# Patient Record
Sex: MALE | Race: WHITE | NOT HISPANIC OR LATINO
[De-identification: names, ages, dates, MRNs, and addresses within clinical notes are randomized per-mention and may not be internally consistent; named-entity substitution may affect disease eponyms.]

---

## 2020-03-06 ENCOUNTER — APPOINTMENT (OUTPATIENT)
Dept: NEUROLOGY | Facility: CLINIC | Age: 65
End: 2020-03-06
Payer: COMMERCIAL

## 2020-03-06 VITALS
DIASTOLIC BLOOD PRESSURE: 106 MMHG | HEIGHT: 74 IN | SYSTOLIC BLOOD PRESSURE: 147 MMHG | HEART RATE: 75 BPM | BODY MASS INDEX: 26.18 KG/M2 | WEIGHT: 204 LBS | OXYGEN SATURATION: 97 %

## 2020-03-06 DIAGNOSIS — Z80.42 FAMILY HISTORY OF MALIGNANT NEOPLASM OF PROSTATE: ICD-10-CM

## 2020-03-06 DIAGNOSIS — Z72.89 OTHER PROBLEMS RELATED TO LIFESTYLE: ICD-10-CM

## 2020-03-06 DIAGNOSIS — M54.6 PAIN IN THORACIC SPINE: ICD-10-CM

## 2020-03-06 DIAGNOSIS — M54.16 RADICULOPATHY, LUMBAR REGION: ICD-10-CM

## 2020-03-06 DIAGNOSIS — Z78.9 OTHER SPECIFIED HEALTH STATUS: ICD-10-CM

## 2020-03-06 DIAGNOSIS — Z82.49 FAMILY HISTORY OF ISCHEMIC HEART DISEASE AND OTHER DISEASES OF THE CIRCULATORY SYSTEM: ICD-10-CM

## 2020-03-06 PROBLEM — Z00.00 ENCOUNTER FOR PREVENTIVE HEALTH EXAMINATION: Status: ACTIVE | Noted: 2020-03-06

## 2020-03-06 PROCEDURE — 99203 OFFICE O/P NEW LOW 30 MIN: CPT

## 2020-03-06 NOTE — CONSULT LETTER
[Dear  ___] : Dear  [unfilled], [Consult Letter:] : I had the pleasure of evaluating your patient, [unfilled]. [Please see my note below.] : Please see my note below. [Sincerely,] : Sincerely, [FreeTextEntry3] : Jhon Zepeda MD\par  Professor of Neurology\par  Director of Neurology Outreach Programs \par  [facility]\par

## 2020-03-06 NOTE — HISTORY OF PRESENT ILLNESS
[FreeTextEntry1] : Assessment febrile gentleman who has back pain discomfort involving his left hip and discomfort in his left leg as well as abdominal pain and discomfort above the region of his left hip and abdomen region. He does not describe any bowel or bladder difficulties he has no problem with his neck or upper extremities. He's had a normal ultrasound which was unremarkable. Sedimentation is drowsy and this problem and was the consideration of surgery spontaneously got better.

## 2020-03-06 NOTE — ASSESSMENT
[FreeTextEntry1] : This is a 59-year-old gentleman who has a history most consistent with left L5 radiculopathy he has weakness and sensory loss in the left L5 distribution and positive straight leg raising. Because of his abdominal pain will only proceed with MRI of his lumbar addition his thoracic spine we'll have a physical therapy evaluation is completed she continue EMG studies will be obtained an implant seen for followup.

## 2020-03-06 NOTE — PHYSICAL EXAM
[FreeTextEntry1] : The patient is  awake and alert answering appropriately cranial nerves with movement fundi benign no facial asymmetry lower cranial nerves are normal he appears to be slightly abnormal as he throws with walking specific motor exam reveals weakness in the left L5 distribution and color temperature or skin changes noted no bruits detectable graphesthesia astereognosis localization station to be within normal limits. Right lower extremity appears to be normal there is no evidence of any tenderness noted to his thoracic region but he does have paraspinal muscle spasm in his low back area.

## 2020-03-13 ENCOUNTER — APPOINTMENT (OUTPATIENT)
Dept: MRI IMAGING | Facility: CLINIC | Age: 65
End: 2020-03-13
Payer: COMMERCIAL

## 2020-03-13 ENCOUNTER — OUTPATIENT (OUTPATIENT)
Dept: OUTPATIENT SERVICES | Facility: HOSPITAL | Age: 65
LOS: 1 days | End: 2020-03-13

## 2020-03-13 PROCEDURE — 72148 MRI LUMBAR SPINE W/O DYE: CPT | Mod: 26

## 2020-03-13 PROCEDURE — 72146 MRI CHEST SPINE W/O DYE: CPT | Mod: 26
